# Patient Record
(demographics unavailable — no encounter records)

---

## 2020-01-06 NOTE — RAD
LUMBAR SPINE 3 VIEWS:

 

Date:  01/06/2020

 

HISTORY:  

Disability evaluation, low back pain. 

 

FINDINGS:

Degenerative changes are present. No fracture, subluxation, or bony destruction identified. 

 

IMPRESSION: 

Lumbar spondylosis. 

 

 

POS: [facility]

## 2020-01-06 NOTE — RAD
Exam:2 views left knee



HISTORY: Pain. Disability evaluation.



COMPARISON: None



FINDINGS: Chondrocalcinosis of the medial and lateral compartment. Moderate joint space narrowing med
ially, mild to moderate lateral joint space narrowing and is severe narrowing of the patellofemoral

compartment.



No fracture or significant joint effusion.



IMPRESSION: 

1. Chondrocalcinosis.

2. Tricompartmental degenerative change.



Reported By: Malcom Kingsley 

Electronically Signed:  1/6/2020 2:29 PM